# Patient Record
Sex: MALE | Race: ASIAN | NOT HISPANIC OR LATINO
[De-identification: names, ages, dates, MRNs, and addresses within clinical notes are randomized per-mention and may not be internally consistent; named-entity substitution may affect disease eponyms.]

---

## 2019-06-27 ENCOUNTER — APPOINTMENT (OUTPATIENT)
Dept: OPHTHALMOLOGY | Facility: CLINIC | Age: 84
End: 2019-06-27

## 2019-07-26 ENCOUNTER — APPOINTMENT (OUTPATIENT)
Dept: OPHTHALMOLOGY | Facility: CLINIC | Age: 84
End: 2019-07-26
Payer: MEDICARE

## 2019-07-26 ENCOUNTER — NON-APPOINTMENT (OUTPATIENT)
Age: 84
End: 2019-07-26

## 2019-07-26 PROCEDURE — 92134 CPTRZ OPH DX IMG PST SGM RTA: CPT

## 2019-07-26 PROCEDURE — 92004 COMPRE OPH EXAM NEW PT 1/>: CPT

## 2020-05-28 ENCOUNTER — APPOINTMENT (OUTPATIENT)
Dept: OPHTHALMOLOGY | Facility: CLINIC | Age: 85
End: 2020-05-28

## 2020-09-10 ENCOUNTER — NON-APPOINTMENT (OUTPATIENT)
Age: 85
End: 2020-09-10

## 2020-09-10 ENCOUNTER — APPOINTMENT (OUTPATIENT)
Dept: OPHTHALMOLOGY | Facility: CLINIC | Age: 85
End: 2020-09-10
Payer: MEDICARE

## 2020-09-10 PROCEDURE — 92014 COMPRE OPH EXAM EST PT 1/>: CPT

## 2020-09-10 PROCEDURE — 92134 CPTRZ OPH DX IMG PST SGM RTA: CPT

## 2021-11-18 ENCOUNTER — NON-APPOINTMENT (OUTPATIENT)
Age: 86
End: 2021-11-18

## 2021-11-18 ENCOUNTER — APPOINTMENT (OUTPATIENT)
Dept: OPHTHALMOLOGY | Facility: CLINIC | Age: 86
End: 2021-11-18
Payer: MEDICARE

## 2021-11-18 PROCEDURE — 92014 COMPRE OPH EXAM EST PT 1/>: CPT

## 2021-11-18 PROCEDURE — 92134 CPTRZ OPH DX IMG PST SGM RTA: CPT

## 2023-04-03 ENCOUNTER — APPOINTMENT (OUTPATIENT)
Dept: NEUROLOGY | Facility: CLINIC | Age: 88
End: 2023-04-03

## 2023-08-17 ENCOUNTER — EMERGENCY (EMERGENCY)
Facility: HOSPITAL | Age: 88
LOS: 1 days | Discharge: LEFT BEFORE DISCHARGE | End: 2023-08-17
Attending: STUDENT IN AN ORGANIZED HEALTH CARE EDUCATION/TRAINING PROGRAM | Admitting: SURGERY
Payer: MEDICARE

## 2023-08-17 ENCOUNTER — TRANSCRIPTION ENCOUNTER (OUTPATIENT)
Age: 88
End: 2023-08-17

## 2023-08-17 VITALS
SYSTOLIC BLOOD PRESSURE: 141 MMHG | OXYGEN SATURATION: 100 % | HEART RATE: 68 BPM | DIASTOLIC BLOOD PRESSURE: 61 MMHG | TEMPERATURE: 98 F | RESPIRATION RATE: 16 BRPM

## 2023-08-17 VITALS
RESPIRATION RATE: 18 BRPM | HEART RATE: 67 BPM | SYSTOLIC BLOOD PRESSURE: 146 MMHG | OXYGEN SATURATION: 100 % | DIASTOLIC BLOOD PRESSURE: 60 MMHG | TEMPERATURE: 98 F

## 2023-08-17 DIAGNOSIS — R10.9 UNSPECIFIED ABDOMINAL PAIN: ICD-10-CM

## 2023-08-17 DIAGNOSIS — Z98.890 OTHER SPECIFIED POSTPROCEDURAL STATES: Chronic | ICD-10-CM

## 2023-08-17 LAB
ALBUMIN SERPL ELPH-MCNC: 4.2 G/DL — SIGNIFICANT CHANGE UP (ref 3.3–5)
ALP SERPL-CCNC: 63 U/L — SIGNIFICANT CHANGE UP (ref 40–120)
ALT FLD-CCNC: 23 U/L — SIGNIFICANT CHANGE UP (ref 4–41)
ANION GAP SERPL CALC-SCNC: 7 MMOL/L — SIGNIFICANT CHANGE UP (ref 7–14)
APPEARANCE UR: CLEAR — SIGNIFICANT CHANGE UP
AST SERPL-CCNC: 29 U/L — SIGNIFICANT CHANGE UP (ref 4–40)
BACTERIA # UR AUTO: NEGATIVE /HPF — SIGNIFICANT CHANGE UP
BASOPHILS # BLD AUTO: 0.07 K/UL — SIGNIFICANT CHANGE UP (ref 0–0.2)
BASOPHILS NFR BLD AUTO: 0.9 % — SIGNIFICANT CHANGE UP (ref 0–2)
BILIRUB SERPL-MCNC: 0.8 MG/DL — SIGNIFICANT CHANGE UP (ref 0.2–1.2)
BILIRUB UR-MCNC: NEGATIVE — SIGNIFICANT CHANGE UP
BLOOD GAS VENOUS COMPREHENSIVE RESULT: SIGNIFICANT CHANGE UP
BLOOD GAS VENOUS COMPREHENSIVE RESULT: SIGNIFICANT CHANGE UP
BUN SERPL-MCNC: 13 MG/DL — SIGNIFICANT CHANGE UP (ref 7–23)
CALCIUM SERPL-MCNC: 9.6 MG/DL — SIGNIFICANT CHANGE UP (ref 8.4–10.5)
CAST: 0 /LPF — SIGNIFICANT CHANGE UP (ref 0–4)
CHLORIDE SERPL-SCNC: 101 MMOL/L — SIGNIFICANT CHANGE UP (ref 98–107)
CO2 SERPL-SCNC: 27 MMOL/L — SIGNIFICANT CHANGE UP (ref 22–31)
COLOR SPEC: YELLOW — SIGNIFICANT CHANGE UP
CREAT SERPL-MCNC: 0.93 MG/DL — SIGNIFICANT CHANGE UP (ref 0.5–1.3)
DIFF PNL FLD: NEGATIVE — SIGNIFICANT CHANGE UP
EGFR: 79 ML/MIN/1.73M2 — SIGNIFICANT CHANGE UP
EOSINOPHIL # BLD AUTO: 0.54 K/UL — HIGH (ref 0–0.5)
EOSINOPHIL NFR BLD AUTO: 6.6 % — HIGH (ref 0–6)
GLUCOSE SERPL-MCNC: 151 MG/DL — HIGH (ref 70–99)
GLUCOSE UR QL: >=1000 MG/DL
HCT VFR BLD CALC: 39.7 % — SIGNIFICANT CHANGE UP (ref 39–50)
HGB BLD-MCNC: 12.5 G/DL — LOW (ref 13–17)
IANC: 4.43 K/UL — SIGNIFICANT CHANGE UP (ref 1.8–7.4)
IMM GRANULOCYTES NFR BLD AUTO: 0.2 % — SIGNIFICANT CHANGE UP (ref 0–0.9)
KETONES UR-MCNC: NEGATIVE MG/DL — SIGNIFICANT CHANGE UP
LEUKOCYTE ESTERASE UR-ACNC: NEGATIVE — SIGNIFICANT CHANGE UP
LIDOCAIN IGE QN: 45 U/L — SIGNIFICANT CHANGE UP (ref 7–60)
LYMPHOCYTES # BLD AUTO: 2.23 K/UL — SIGNIFICANT CHANGE UP (ref 1–3.3)
LYMPHOCYTES # BLD AUTO: 27.2 % — SIGNIFICANT CHANGE UP (ref 13–44)
MCHC RBC-ENTMCNC: 28.5 PG — SIGNIFICANT CHANGE UP (ref 27–34)
MCHC RBC-ENTMCNC: 31.5 GM/DL — LOW (ref 32–36)
MCV RBC AUTO: 90.4 FL — SIGNIFICANT CHANGE UP (ref 80–100)
MONOCYTES # BLD AUTO: 0.91 K/UL — HIGH (ref 0–0.9)
MONOCYTES NFR BLD AUTO: 11.1 % — SIGNIFICANT CHANGE UP (ref 2–14)
NEUTROPHILS # BLD AUTO: 4.43 K/UL — SIGNIFICANT CHANGE UP (ref 1.8–7.4)
NEUTROPHILS NFR BLD AUTO: 54 % — SIGNIFICANT CHANGE UP (ref 43–77)
NITRITE UR-MCNC: NEGATIVE — SIGNIFICANT CHANGE UP
NRBC # BLD: 0 /100 WBCS — SIGNIFICANT CHANGE UP (ref 0–0)
NRBC # FLD: 0 K/UL — SIGNIFICANT CHANGE UP (ref 0–0)
PH UR: 7 — SIGNIFICANT CHANGE UP (ref 5–8)
PLATELET # BLD AUTO: 230 K/UL — SIGNIFICANT CHANGE UP (ref 150–400)
POTASSIUM SERPL-MCNC: 5 MMOL/L — SIGNIFICANT CHANGE UP (ref 3.5–5.3)
POTASSIUM SERPL-SCNC: 5 MMOL/L — SIGNIFICANT CHANGE UP (ref 3.5–5.3)
PROT SERPL-MCNC: 7.8 G/DL — SIGNIFICANT CHANGE UP (ref 6–8.3)
PROT UR-MCNC: SIGNIFICANT CHANGE UP MG/DL
RBC # BLD: 4.39 M/UL — SIGNIFICANT CHANGE UP (ref 4.2–5.8)
RBC # FLD: 13.3 % — SIGNIFICANT CHANGE UP (ref 10.3–14.5)
RBC CASTS # UR COMP ASSIST: 0 /HPF — SIGNIFICANT CHANGE UP (ref 0–4)
SODIUM SERPL-SCNC: 135 MMOL/L — SIGNIFICANT CHANGE UP (ref 135–145)
SP GR SPEC: 1.02 — SIGNIFICANT CHANGE UP (ref 1–1.03)
SQUAMOUS # UR AUTO: 0 /HPF — SIGNIFICANT CHANGE UP (ref 0–5)
TROPONIN T, HIGH SENSITIVITY RESULT: 25 NG/L — SIGNIFICANT CHANGE UP
UROBILINOGEN FLD QL: 0.2 MG/DL — SIGNIFICANT CHANGE UP (ref 0.2–1)
WBC # BLD: 8.2 K/UL — SIGNIFICANT CHANGE UP (ref 3.8–10.5)
WBC # FLD AUTO: 8.2 K/UL — SIGNIFICANT CHANGE UP (ref 3.8–10.5)
WBC UR QL: 0 /HPF — SIGNIFICANT CHANGE UP (ref 0–5)

## 2023-08-17 PROCEDURE — 93010 ELECTROCARDIOGRAM REPORT: CPT

## 2023-08-17 PROCEDURE — 74177 CT ABD & PELVIS W/CONTRAST: CPT | Mod: 26,MA

## 2023-08-17 RX ORDER — ACETAMINOPHEN 500 MG
1000 TABLET ORAL ONCE
Refills: 0 | Status: COMPLETED | OUTPATIENT
Start: 2023-08-17 | End: 2023-08-17

## 2023-08-17 RX ORDER — SODIUM CHLORIDE 9 MG/ML
500 INJECTION INTRAMUSCULAR; INTRAVENOUS; SUBCUTANEOUS ONCE
Refills: 0 | Status: COMPLETED | OUTPATIENT
Start: 2023-08-17 | End: 2023-08-17

## 2023-08-17 RX ADMIN — SODIUM CHLORIDE 500 MILLILITER(S): 9 INJECTION INTRAMUSCULAR; INTRAVENOUS; SUBCUTANEOUS at 14:14

## 2023-08-17 NOTE — ED PROVIDER NOTE - CLINICAL SUMMARY MEDICAL DECISION MAKING FREE TEXT BOX
History of Present Illness: 88-year-old male with past medical history of hypertension, arthritis, previous CVA, non-insulin-dependent diabetes, dyslipidemia, coronary artery disease, presents to the emergency department due to 2 weeks of abdominal pain which has doubled him over due to its severity.  He denies any bloody vomit, nausea, changes in bowel movements (although he has irregular bowel movements).  All other systems negative except as noted in the HPI    General: Alert, oriented to person, time, place  Psych: mood appropriate  Head: normocephalic; atraumatic  Eyes: conjunctivae clear bilaterally, sclerae anicteric  ENT: no nasal flaring, patent nares  Cardio: Regular rate and rhythm; normal heart sounds  Resp: Clear to auscultation bilaterally  GI: Diffuse lower abdominal tenderness; abdomen soft, nondistended  Neuro: Strength 5/5 in upper and lower extremities; normal sensation  Skin: No rashes or bruising noted  MSK: Normal movement of extremities  Lymph/Vasc: No LE edema    Medical Decision Makin-year-old male here for abdominal pain.  At this time, differential diagnosis includes but is not limited to the following: Obstruction, infection, UTI, less likely mesenteric ischemia.  We will treat his pain, obtain a CT abdomen and pelvis, basic lab work.

## 2023-08-17 NOTE — CONSULT NOTE ADULT - SUBJECTIVE AND OBJECTIVE BOX
VASCULAR SURGERY CONSULT NOTE  --------------------------------------------------------------------------------------------    HPI:   Edison is an 87 y/o man with PMH of  HTN, HLD, CVA (no residual), s/p Left Carotid Endarterectomy on Clopidegrel who presents with 15 days of intermittent abdominal pain. The pain has occurred 3 or 4 times in the past two weeks and is located in the lower abdomen bilaterally. The pain is rapid in onset and seems crampy in nature, he typically rests for 1-1.5hrs and when he wakes up it is relieved There is no specific relationship to food and often comes in the afternoons. He has been tolerating food well though he does note some early satiety. He denies recent weight loss, denies loss of appetite, and denies fear of food. He has suffered from constipation in the past and has been taking fiber supplements. He usually goes in the AM but it can sometimes be hard. He denies any nausea or vomiting, fever, or chills. He reports no blood or change in caliber of the stool recently. He does have a known R inguinal hernia - first presented as R groin pain 6 months ago but went away by itself and has not returned. Denies obstructive symptoms.     In the ED he is aferbile, mildly hypertensive, and normocardic. His labs are unremarkable including a WBC of 8 and a lactate of 1.8. CT scan demonstrated large calcifications at the origin of   celiac axis and SMA with concern for stenosis at the origin of both vessels for which vascular surgery was consulted.       ROS: 10-system review is otherwise negative except HPI above.      PAST MEDICAL & SURGICAL HISTORY:  HTN (hypertension)  Arthritis  Cerebrovascular accident (CVA), unspecified mechanism  in , no residuals  DM2 (diabetes mellitus, type 2)  Hypercholesterolemia  CAD (coronary artery disease)  H/O carotid endarterectomy  L  S/P eye surgery  b/l        FAMILY HISTORY:  No pertinent family history in first degree relatives    [] Family history not pertinent as reviewed with the patient and family      ALLERGIES: No Known Drug Allergies  shellfish (Rash)      SOCIAL HISTORY:    Remote hx of smoking, no drinking. Here with son  --------------------------------------------------------------------------------------------    Vitals:   T(C): 36.3 (23 @ 17:02), Max: 36.9 (23 @ 12:30)  HR: 97 (23 17:02) (67 - 97)  BP: 143/57 (23 @ 17:02) (143/57 - 146/60)  RR: 18 (23 17:02) (18 - 18)  SpO2: 100% (23 17:02) (100% - 100%)  CAPILLARY BLOOD GLUCOSE      POCT Blood Glucose.: 168 mg/dL (17 Aug 2023 12:34)          Physical Examination:  GEN: NAD, resting quietly  NEURO: AAOx3, CN II-XII grossly intact, no focal deficits  PULM: symmetric chest rise bilaterally, no increased WOB  ABD: soft, nontender, nondistended, R inguinal hernia appreciated with cough but no incarceration or strangulation  EXTR: no lower extremity edema, moving all extremities  --------------------------------------------------------------------------------------------    LABS  CBC ( 14:15)                              12.5<L>                         8.20    )----------------(  230        54.0  % Neutrophils, 27.2  % Lymphocytes, ANC: 4.43                                39.7      BMP ( 14:15)             135     |  101     |  13    		Ca++ --      Ca 9.6                ---------------------------------( 151<H>		Mg --                 5.0     |  27      |  0.93  			Ph --        LFTs ( @ 14:15)      TPro 7.8 / Alb 4.2 / TBili 0.8 / DBili -- / AST 29 / ALT 23 / AlkPhos 63          VBG ( @ 18:02)     7.30<L> / 50 / 41 / 25 / -2.2<L> / 66.1<L>%     Lactate: 1.8  VBG ( @ 14:15)     7.27<L> / 58<H> / 31 / 27 / -1.3 / 42.9<L>%     Lactate: 2.2<H>    --------------------------------------------------------------------------------------------    MICROBIOLOGY  Urinalysis ( @ 14:15):     Color: Yellow / Appearance: Clear / S.018 / pH: 7.0 / Gluc: 151<H> / Ketones: Negative / Bili: Negative / Urobili: 0.2 / Protein :Trace / Nitrites: Negative / Leuk.Est: Negative / RBC: 0 / WBC: 0 / Sq Epi:  / Non Sq Epi: 0 / Bacteria Negative         --------------------------------------------------------------------------------------------    IMAGING  < from: CT Abdomen and Pelvis w/ IV Cont (23 @ 16:28) >    PROCEDURE:  CT of the Abdomen and Pelvis was performed.  Sagittal and coronal reformats were performed.    FINDINGS:  LOWER CHEST: Lung bases are clear. Cardiomegaly. Coronary artery   calcifications.    LIVER: Within normal limits.  BILE DUCTS: Normal caliber.  GALLBLADDER: Within normal limits.  SPLEEN: Within normal limits.  PANCREAS: Within normal limits.  ADRENALS: Within normal limits.  KIDNEYS/URETERS: Symmetric, homogeneous enhancement of the renal   parenchyma. Subcentimeter hypodensity in the right kidney lower pole, too   small to characterize. No hydronephrosis, hydroureter or   nephroureterolithiasis.    BLADDER: Distended urinary bladder.  REPRODUCTIVE ORGANS: Prostate is borderline enlarged.    BOWEL: No bowel obstruction. Appendix is normal. Small bowel loop   extending into the right inguinal hernia, no evidence of obstruction.   Otherwise normal appearance of the stomach, duodenum and small bowel.   Moderate stool burden in the colon, no abnormal bowel wall thickening.  PERITONEUM: No ascites.  VESSELS: Atherosclerotic changes. Large calcifications at the origin of   celiac axis and SMA with concern for stenosis at the origin of both   vessels (304-57) which otherwise are patent.  RETROPERITONEUM/LYMPH NODES: No lymphadenopathy.  ABDOMINAL WALL: Right-sided inguinal hernia containing a loop of   nonobstructed small bowel.  BONES: Multilevel discogenic degenerative changes.    IMPRESSION:  Distended urinary bladder, correlate with symptoms of urinary retention.    Right inguinal hernia containing non obstructed small bowel. Moderate   colonic stool burden as can be seen with constipation.      < end of copied text >      --------------------------------------------------------------------------------------------    ASSESSMENT:  87 y/o man with PMH of  HTN, HLD, CVA (no residual), s/p Left Carotid Endarterectomy on Clopidegrel who presents with 15 days of intermittent abdominal pain, found to have R inguinal hernia and calcification of the origins of the celiac and the SMA.    PLAN:   - To be discussed with vascular fellow shortly with plan to follow  -   -   -    VASCULAR SURGERY CONSULT NOTE  --------------------------------------------------------------------------------------------    HPI:   Edison is an 89 y/o man with PMH of  HTN, HLD, CVA (no residual), s/p Left Carotid Endarterectomy on Clopidegrel who presents with 15 days of intermittent abdominal pain. The pain has occurred 3 or 4 times in the past two weeks and is located in the lower abdomen bilaterally. The pain is rapid in onset and seems crampy in nature, he typically rests for 1-1.5hrs and when he wakes up it is relieved There is no specific relationship to food and often comes in the afternoons. He has been tolerating food well though he does note some early satiety. He denies recent weight loss, denies loss of appetite, and denies fear of food. He has suffered from constipation in the past and has been taking fiber supplements. He usually goes in the AM but it can sometimes be hard. He denies any nausea or vomiting, fever, or chills. He reports no blood or change in caliber of the stool recently. He does have a known R inguinal hernia - first presented as R groin pain 6 months ago but went away by itself and has not returned. Denies obstructive symptoms.     In the ED he is aferbile, mildly hypertensive, and normocardic. His labs are unremarkable including a WBC of 8 and a lactate of 1.8. CT scan demonstrated large calcifications at the origin of   celiac axis and SMA with concern for stenosis at the origin of both vessels for which vascular surgery was consulted.       ROS: 10-system review is otherwise negative except HPI above.      PAST MEDICAL & SURGICAL HISTORY:  HTN (hypertension)  Arthritis  Cerebrovascular accident (CVA), unspecified mechanism  in , no residuals  DM2 (diabetes mellitus, type 2)  Hypercholesterolemia  CAD (coronary artery disease)  H/O carotid endarterectomy  L  S/P eye surgery  b/l        FAMILY HISTORY:  No pertinent family history in first degree relatives    [] Family history not pertinent as reviewed with the patient and family      ALLERGIES: No Known Drug Allergies  shellfish (Rash)      SOCIAL HISTORY:    Remote hx of smoking, no drinking. Here with son  --------------------------------------------------------------------------------------------    Vitals:   T(C): 36.3 (23 @ 17:02), Max: 36.9 (23 @ 12:30)  HR: 97 (23 17:02) (67 - 97)  BP: 143/57 (23 @ 17:02) (143/57 - 146/60)  RR: 18 (23 17:02) (18 - 18)  SpO2: 100% (23 17:02) (100% - 100%)  CAPILLARY BLOOD GLUCOSE      POCT Blood Glucose.: 168 mg/dL (17 Aug 2023 12:34)      Physical Examination:  GEN: NAD, resting quietly  NEURO: AAOx3, CN II-XII grossly intact, no focal deficits  PULM: symmetric chest rise bilaterally, no increased WOB  ABD: soft, nontender, nondistended, R inguinal hernia appreciated with cough but no incarceration or strangulation  EXTR: no lower extremity edema, moving all extremities  --------------------------------------------------------------------------------------------    LABS  CBC ( 14:15)                              12.5<L>                         8.20    )----------------(  230        54.0  % Neutrophils, 27.2  % Lymphocytes, ANC: 4.43                                39.7      BMP ( 14:15)             135     |  101     |  13    		Ca++ --      Ca 9.6                ---------------------------------( 151<H>		Mg --                 5.0     |  27      |  0.93  			Ph --        LFTs ( @ 14:15)      TPro 7.8 / Alb 4.2 / TBili 0.8 / DBili -- / AST 29 / ALT 23 / AlkPhos 63          VBG ( @ 18:02)     7.30<L> / 50 / 41 / 25 / -2.2<L> / 66.1<L>%     Lactate: 1.8  VBG ( @ 14:15)     7.27<L> / 58<H> / 31 / 27 / -1.3 / 42.9<L>%     Lactate: 2.2<H>    --------------------------------------------------------------------------------------------    MICROBIOLOGY  Urinalysis ( @ 14:15):     Color: Yellow / Appearance: Clear / S.018 / pH: 7.0 / Gluc: 151<H> / Ketones: Negative / Bili: Negative / Urobili: 0.2 / Protein :Trace / Nitrites: Negative / Leuk.Est: Negative / RBC: 0 / WBC: 0 / Sq Epi:  / Non Sq Epi: 0 / Bacteria Negative         --------------------------------------------------------------------------------------------    IMAGING  < from: CT Abdomen and Pelvis w/ IV Cont (23 @ 16:28) >    PROCEDURE:  CT of the Abdomen and Pelvis was performed.  Sagittal and coronal reformats were performed.    FINDINGS:  LOWER CHEST: Lung bases are clear. Cardiomegaly. Coronary artery   calcifications.    LIVER: Within normal limits.  BILE DUCTS: Normal caliber.  GALLBLADDER: Within normal limits.  SPLEEN: Within normal limits.  PANCREAS: Within normal limits.  ADRENALS: Within normal limits.  KIDNEYS/URETERS: Symmetric, homogeneous enhancement of the renal   parenchyma. Subcentimeter hypodensity in the right kidney lower pole, too   small to characterize. No hydronephrosis, hydroureter or   nephroureterolithiasis.    BLADDER: Distended urinary bladder.  REPRODUCTIVE ORGANS: Prostate is borderline enlarged.    BOWEL: No bowel obstruction. Appendix is normal. Small bowel loop   extending into the right inguinal hernia, no evidence of obstruction.   Otherwise normal appearance of the stomach, duodenum and small bowel.   Moderate stool burden in the colon, no abnormal bowel wall thickening.  PERITONEUM: No ascites.  VESSELS: Atherosclerotic changes. Large calcifications at the origin of   celiac axis and SMA with concern for stenosis at the origin of both   vessels (304-57) which otherwise are patent.  RETROPERITONEUM/LYMPH NODES: No lymphadenopathy.  ABDOMINAL WALL: Right-sided inguinal hernia containing a loop of   nonobstructed small bowel.  BONES: Multilevel discogenic degenerative changes.    IMPRESSION:  Distended urinary bladder, correlate with symptoms of urinary retention.    Right inguinal hernia containing non obstructed small bowel. Moderate   colonic stool burden as can be seen with constipation.      < end of copied text >      --------------------------------------------------------------------------------------------    ASSESSMENT:  89 y/o man with PMH of  HTN, HLD, CVA (no residual), s/p Left Carotid Endarterectomy on Clopidegrel who presents with 15 days of intermittent abdominal pain, found to have R inguinal hernia and calcification of the origins of the celiac and the SMA.    PLAN:   Patient with notable two-vessel calcification of the celiac & SMA, unclear if the etiology of the abdominal pain vs constipation. However in the setting of severe atherosclerotic disease, cannot rule out chronic mesenteric ischemia. This was discussed at length with the patient and son and the recommendation was for admission to the hospital and CT angio & abdominal duplex. The son expressed his frustration with the ED visit (~9hrs) and reported that his father was too old and fatigued to stay any longer. They were counseled that the safe thing would be to confirm patency and flow to the abdominal vessels but they again expressed their desire to leave. Patient and son were informed they would be leaving against medical advise and they expressed understanding. Patient may follow up with Dr. Rebeca Isabel as an outpatient for workup of potential chronic mesenteric ischemia.    Seen and discussed with Vascular Fellow & Dr. Isabel  Vascular Surgery  [id number]

## 2023-08-17 NOTE — ED PROVIDER NOTE - ATTENDING CONTRIBUTION TO CARE
Attending note:   After face to face evaluation of this patient, I concur with above noted hx, pe, and care plan for this patient.  Lin: 88-year-old male with past medical history as noted above including multiple cardiovascular risk factors.  Patient presents to ED with over 1 week of lower abdominal pain.  History is given by son at the bedside.  Patient has almost daily pain although not every day.  Almost daily pain in the lower abdomen mainly between noon and 8 PM.  Patient denies any nausea or vomiting with it.  Patient occasionally has irregular bowel movements but those usually improve the next day.  Patient also denies any urinary complaints.  Son notes that when patient's pain occurs he is doubled over and appears extremely comfortable.  Patient also occasionally notices a lump to the groin at that time.  Patient denies any fevers or chills or difficulty urinating.  On exam patient is well-appearing and pain is not severe at this time.  Lungs are clear and heart is regular.  Abdomen is soft.  Minimal lower abdominal tenderness.  There is no CVA tenderness and no midline spinal tenderness.  Patient is ambulating well.  There is no significant pitting edema or calf tenderness noted.  Given patient's age and risk factors will get labs, UA and CT scan.  Given patient's pain is not severe at this time we will hold off on pain medication.

## 2023-08-17 NOTE — ED PROVIDER NOTE - EKG ADDITIONAL INFORMATION FREE TEXT
1st degree heart block; Ventricular rate of 67 bpm, TN interval of 254 ms, QRS of 84 ms, QTc of 418 ms; no ST segment changes, no T wave abnormalities

## 2023-08-17 NOTE — ED PROVIDER NOTE - PROGRESS NOTE DETAILS
Greyson Rocha MD: Vascular surgery consulted at this time to evaluate for SMA narrowing identified on CT scan.  Disposition pending. MD Jen: Signout received on this patient from Dr. herron. Patient with downtrending lactate, evaluated by vascular surgery, cleared for outpatient f/u by vascular attending as no intervention needed at this time. Offered medical admission for further workup and PRN pain control, however patient and his family decline requesting d/c and outpatient f/u. repeat abd exam benign. MD Jen: Signout received on this patient from Dr. herron. Patient with downtrending lactate, evaluated by vascular surgery, cleared for outpatient f/u by vascular attending as no intervention needed at this time. Offered medical admission for further workup and PRN pain control, however patient and his family decline requesting d/c and outpatient f/u. repeat abd exam benign. strict return precautions given. MD Jen: Signout received on this patient from Dr. herron. Accepted to vascular surgery service for further workup of SMA stenosis. MD Jen: Signout received on this patient from Dr. herron. Accepted to vascular surgery service for further workup of SMA stenosis. However, patient and son adamantly decline admission, pt states he feels fine, understands risk of death/deterioriation, however they are unhappy with wait times at [facility] to see physicians, wait times for CT scan, and do not agree with need for admission. States they have spoken to too many different doctors (resident, fellow, attendings), and they have lost lupe in this hospital.  used. Strict return precautions given, will d/c AMA. PA ALi: Spoke with patient who is persistent on not staying in the hospital states that he would like to leave it up to god. Pt advised to return if symptoms worsen or persist

## 2023-08-17 NOTE — ED PROVIDER NOTE - NSICDXPASTMEDICALHX_GEN_ALL_CORE_FT
PAST MEDICAL HISTORY:  Arthritis     CAD (coronary artery disease)     Cerebrovascular accident (CVA), unspecified mechanism in 2007, no residuals    DM2 (diabetes mellitus, type 2)     HTN (hypertension)     Hypercholesterolemia

## 2023-08-17 NOTE — ED ADULT NURSE NOTE - OBJECTIVE STATEMENT
patient came in c/o abdominal pain. denies any pain. denies any complaints  son by bed side translating labs done as ordered. awaiting Ct and results, NAD

## 2023-08-17 NOTE — ED ADULT NURSE NOTE - NSFALLHARMRISKINTERV_ED_ALL_ED

## 2023-08-17 NOTE — ED PROVIDER NOTE - PATIENT PORTAL LINK FT
You can access the FollowMyHealth Patient Portal offered by [facility] by registering at the following website: [email or web address]. By joining [facility]’s FollowMyHealth portal, you will also be able to view your health information using other applications (apps) compatible with our system.

## 2023-08-17 NOTE — ED ADULT TRIAGE NOTE - CHIEF COMPLAINT QUOTE
c/o diffused abd pain onset 2 days ago worse today, denies N./v fever chills Hx of cardiac stents int eh past and DM.

## 2023-08-18 LAB
CULTURE RESULTS: SIGNIFICANT CHANGE UP
SPECIMEN SOURCE: SIGNIFICANT CHANGE UP

## 2024-07-01 ENCOUNTER — APPOINTMENT (OUTPATIENT)
Dept: OPHTHALMOLOGY | Facility: CLINIC | Age: 89
End: 2024-07-01